# Patient Record
(demographics unavailable — no encounter records)

---

## 2024-10-22 NOTE — LETTER BODY
[FreeTextEntry1] : Rodney Ann MD [address] [facility], [address] [place], [address] [phone]  Dear Dr. Ann,  Reason for visit: BPH. Urinary frequency. Elevated PSA.    This is a 77 year-old gentleman with diabetes mellitus and history of elevated PSA presenting with urinary frequency and chronic BPH. His ultrasound in October 2021 was unremarkable. Patient returns today for follow up. Since his last visit, the patient has been taking Myrbetriq, Avodart and Uroxatral as directed without any side effects or difficulties. He reports improved urinary symptoms. He notes stable uroflow and improved urinary frequency. The patient denies any dysuria, hematuria or changes in health. Patient has no pain. The past medical history and family history and social history are unchanged. All other review of systems are negative. Patient denies any changes in medications. Medication list was reconciled.    On examination, the patient is an overweight-appearing gentleman in no acute distress. He is alert and oriented follows commands. He has normal mood and affect. He is normocephalic. Oral no thrush. Neck is supple. Respirations are unlabored. His abdomen is soft and nontender. Liver is nonpalpable. Bladder is nonpalpable. No CVA tenderness. Neurologically he is grossly intact. No peripheral edema. Skin without gross abnormality.     Assessment: BPH. Urinary frequency. Elevated PSA.    I counseled the patient. In terms of his BPH, the patient notes stable urinary symptoms on medical therapy. I recommended patient continue Uroxatral and Avodart. In terms of his urinary frequency, the patient notes improvement of his urinary frequency on medical therapy. I recommended he continue taking Myrbetriq. I renewed the patient's prescriptions today. I encouraged the patient to continue medications regularly as directed. I recommended he obtain PSA, and BMP to ensure stability. Risks and alternatives were discussed. I answered the patient's questions. The patient will follow up as directed and will contact me with any questions or concerns. Thank you for the opportunity to participate in the care of Mr. COOMBS. I will keep you updated on his progress.    Plan: Continue Uroxatral and Avodart. Continue Myrbetriq. PSA. BMP. Follow up in 1 year.

## 2024-10-22 NOTE — ADDENDUM
[FreeTextEntry1] : Entered by Morgan Hall, acting as scribe for Dr. John Desai. The documentation recorded by the scribe accurately reflects the service I personally performed and the decisions made by me.